# Patient Record
Sex: FEMALE
[De-identification: names, ages, dates, MRNs, and addresses within clinical notes are randomized per-mention and may not be internally consistent; named-entity substitution may affect disease eponyms.]

---

## 2018-11-02 ENCOUNTER — HOSPITAL ENCOUNTER (EMERGENCY)
Dept: HOSPITAL 42 - ED | Age: 23
Discharge: HOME | End: 2018-11-02
Payer: MEDICAID

## 2018-11-02 VITALS — OXYGEN SATURATION: 99 % | DIASTOLIC BLOOD PRESSURE: 64 MMHG | SYSTOLIC BLOOD PRESSURE: 103 MMHG

## 2018-11-02 VITALS — BODY MASS INDEX: 21.9 KG/M2

## 2018-11-02 VITALS — RESPIRATION RATE: 18 BRPM | TEMPERATURE: 98.4 F

## 2018-11-02 VITALS — HEART RATE: 96 BPM

## 2018-11-02 DIAGNOSIS — N39.0: Primary | ICD-10-CM

## 2018-11-02 DIAGNOSIS — D72.829: ICD-10-CM

## 2018-11-02 LAB
ALBUMIN SERPL-MCNC: 4.3 G/DL (ref 3–4.8)
ALBUMIN/GLOB SERPL: 1.3 {RATIO} (ref 1.1–1.8)
ALT SERPL-CCNC: 21 U/L (ref 7–56)
APPEARANCE UR: (no result)
AST SERPL-CCNC: 23 U/L (ref 14–36)
BACTERIA #/AREA URNS HPF: (no result) /[HPF]
BASOPHILS # BLD AUTO: 0.02 K/MM3 (ref 0–2)
BASOPHILS NFR BLD: 0.2 % (ref 0–3)
BILIRUB UR-MCNC: NEGATIVE MG/DL
BUN SERPL-MCNC: 10 MG/DL (ref 7–21)
CALCIUM SERPL-MCNC: 8.8 MG/DL (ref 8.4–10.5)
COLOR UR: (no result)
EOSINOPHIL # BLD: 0.1 10*3/UL (ref 0–0.7)
EOSINOPHIL NFR BLD: 1 % (ref 1.5–5)
ERYTHROCYTE [DISTWIDTH] IN BLOOD BY AUTOMATED COUNT: 12.8 % (ref 11.5–14.5)
GFR NON-AFRICAN AMERICAN: > 60
GLUCOSE UR STRIP-MCNC: NEGATIVE MG/DL
GRANULOCYTES # BLD: 9.59 10*3/UL (ref 1.4–6.5)
GRANULOCYTES NFR BLD: 73.5 % (ref 50–68)
HGB BLD-MCNC: 13.7 G/DL (ref 12–16)
LEUKOCYTE ESTERASE UR-ACNC: (no result) LEU/UL
LIPASE SERPL-CCNC: 87 U/L (ref 23–300)
LYMPHOCYTES # BLD: 2.3 10*3/UL (ref 1.2–3.4)
LYMPHOCYTES NFR BLD AUTO: 17.5 % (ref 22–35)
MCH RBC QN AUTO: 28.7 PG (ref 25–35)
MCHC RBC AUTO-ENTMCNC: 33.7 G/DL (ref 31–37)
MCV RBC AUTO: 85.3 FL (ref 80–105)
MONOCYTES # BLD AUTO: 1 10*3/UL (ref 0.1–0.6)
MONOCYTES NFR BLD: 7.8 % (ref 1–6)
PH UR STRIP: 6 [PH] (ref 4.7–8)
PLATELET # BLD: 284 10^3/UL (ref 120–450)
PMV BLD AUTO: 9.1 FL (ref 7–11)
PROT UR STRIP-MCNC: 100 MG/DL
RBC # BLD AUTO: 4.77 10^6/UL (ref 3.5–6.1)
RBC # UR STRIP: (no result) /UL
RBC #/AREA URNS HPF: (no result) /HPF (ref 0–2)
SP GR UR STRIP: >= 1.03 (ref 1–1.03)
UROBILINOGEN UR STRIP-ACNC: 0.2 E.U./DL
WBC # BLD AUTO: 13 10^3/UL (ref 4.5–11)
WBC #/AREA URNS HPF: (no result) /HPF (ref 0–6)

## 2018-11-02 NOTE — ED PDOC
Arrival/HPI





- General


Chief Complaint: Female Genitourinary


Time Seen by Provider: 11/02/18 18:50


Historian: Patient





- History of Present Illness


Time/Duration: Other (2 days)


Symptom Onset: Gradual


Symptom Course: Unchanged


Quality: Aching


Severity Level: Mild


Activities at Onset: Rest


Associated Symptoms (Text): 





11/02/18 19:01


Patient complains of a 2 day history of dysuria frequency and mild hematuria. 

She developed some right flank pain today. No abdominal pain nausea vomiting or 

diarrhea. No radiation of the pain. No fever or chills. She has never 

experienced this previously. LMP is one month ago. She appears comfortable and 

in no distress. She does not need pain medication at this time.





Past Medical History





- Infectious Disease


Hx of Infectious Diseases: None





- Genitourinary/Gynecological


Hx Urinary Tract Infection: Yes





- Psychiatric


Hx Substance Use: No





- Anesthesia


Hx Anesthesia: No





Family/Social History





- Physician Review


Nursing Documentation Reviewed: Yes


Family/Social History: Unknown Family HX


Smoking Status: Never Smoked


Hx Alcohol Use: Yes


Frequency of alcohol use: Socially


Hx Substance Use: No





Allergies/Home Meds


Allergies/Adverse Reactions: 


Allergies





No Known Allergies Allergy (Verified 11/02/18 18:41)


   











Review of Systems





- Physician Review


All systems were reviewed & negative as marked: Yes





- Review of Systems


Constitutional: absent: Fatigue, Fevers


Respiratory: Normal


Cardiovascular: Normal


Gastrointestinal: Normal


Genitourinary Female: Dysuria, Frequency, Hematuria.  absent: Vaginal Bleeding, 

Vaginal Discharge





Physical Exam





Vital Signs











  Temp Pulse Resp BP Pulse Ox


 


 11/02/18 18:42  98.4 F  102 H  18  118/77  100











Temperature: Afebrile


Blood Pressure: Normal


Pulse: Regular


Respiratory Rate: Normal


Appearance: Positive for: Well-Appearing, Non-Toxic, Comfortable


Pain Distress: None


Mental Status: Positive for: Alert and Oriented X 3





- Systems Exam


Head: Present: Atraumatic, Normocephalic


Neck: Present: Normal Range of Motion


Respiratory/Chest: Present: Clear to Auscultation, Good Air Exchange.  No: 

Respiratory Distress, Accessory Muscle Use


Cardiovascular: Present: Regular Rate and Rhythm, Normal S1, S2.  No: Murmurs


Abdomen: Present: Normal Bowel Sounds, Other (no CVA tenderness).  No: 

Tenderness, Distention, Peritoneal Signs, Rebound, Guarding


Back: Present: Normal Inspection.  No: CVA Tenderness, Midline Tenderness, 

Paraspinal Tenderness


Upper Extremity: Present: Normal Inspection.  No: Cyanosis, Edema


Lower Extremity: Present: Normal Inspection.  No: Edema


Neurological: Present: GCS=15, CN II-XII Intact, Speech Normal, Motor Func 

Grossly Intact


Skin: Present: Warm, Dry, Normal Color.  No: Rashes


Psychiatric: Present: Alert, Oriented x 3, Normal Insight, Normal Concentration





Medical Decision Making


ED Course and Treatment: 





11/02/18 19:03


Probable UTI. Will get CT scan to rule out nephrolithiasis.





- RAD Interpretation


Narrative RAD Interpretations (Text): 


11/02/18 20:40


CT abd/pelvis reviewed, shows: 


IMPRESSION: 


1. Small fat-containing umbilical hernia is noted. 


2. Appendix is not identified.   There is  no evidence of acute appendicitis. 


3. Small amount of fluid in the cul-de-sac. 


4. Bladder wall thickening is noted measuring up to 7 mm, consistent with 

cystitis.


Radiology Orders: 











11/02/18 18:59


ABD & PELVIS W/O PO OR IV CONT [CT] Stat 














- Scribe Statement


The provider has reviewed the documentation as recorded by the Scribdawn Perdomo





All medical record entries made by the Scribe were at my direction and 

personally dictated by me. I have reviewed the chart and agree that the record 

accurately reflects my personal performance of the history, physical exam, 

medical decision making, and the department course for this patient. I have also

 personally directed, reviewed, and agree with the discharge instructions and 

disposition.














Disposition/Present on Arrival





- Present on Arrival


Any Indicators Present on Arrival: No


History of DVT/PE: No


History of Uncontrolled Diabetes: No


Urinary Catheter: No


History of Decub. Ulcer: No


History Surgical Site Infection Following: None





- Disposition


Have Diagnosis and Disposition been Completed?: Yes


Diagnosis: 


 Urinary tract infection, Leukocytosis





Disposition: HOME/ ROUTINE


Disposition Time: 20:42


Patient Plan: Discharge


Condition: GOOD


Discharge Instructions (ExitCare):  Urinary Tract Infections in Adults


Additional Instructions: 


symptomatic treatment. Tylenol or Advil as directed on bottle as needed.  

Follow-up with PMD. Follow up in ER as needed.


Prescriptions: 


Sulfamethoxazole/Trimethoprim [Bactrim  mg-160 mg] 1 tab PO BID #14 tab


Phenazopyridine HCl [Pyridium] 200 mg PO Q8 #9 tablet


Referrals: 


PCP,NO [Primary Care Provider] - Follow up with primary


Forms:  [employer] (English), WORK NOTE

## 2018-11-03 NOTE — CT
Date of service: 



11/02/2018



PROCEDURE:  CT Abdomen and Pelvis without intravenous contrast



HISTORY:

right stone run



COMPARISON:

None.



TECHNIQUE:

Without contrast.. 



Contrast dose: 



Radiation dose:



Total exam DLP = 283.3 mGy-cm.



This CT exam was performed using one or more of the following dose 

reduction techniques: Automated exposure control, adjustment of the 

mA and/or kV according to patient size, and/or use of iterative 

reconstruction technique.



FINDINGS:



LOWER THORAX:

Unremarkable. 



LIVER:

Unremarkable. No gross lesion or ductal dilatation.  



GALLBLADDER AND BILE DUCTS:

Unremarkable. 



PANCREAS:

Unremarkable. No gross lesion or ductal dilatation.



SPLEEN:

Unremarkable. 



ADRENALS:

Unremarkable. No mass. 



KIDNEYS AND URETERS:

Unremarkable. No hydronephrosis. No solid mass. 



VASCULATURE:

Unremarkable. No aortic aneurysm. No aortic atherosclerotic 

calcification or mural plaque present.



BOWEL:

Unremarkable. No obstruction. No gross mural thickening. 



APPENDIX:

Not visualized 



PERITONEUM:

Small amount of free fluid 



LYMPH NODES:

Unremarkable. No enlarged lymph nodes. 



BLADDER:

Mild bladder wall thickening.  This could be due to lack of 

distension.  Cystitis cannot be excluded 



REPRODUCTIVE:

Unremarkable. 



BONES:

No acute fracture. 



OTHER FINDINGS:

The report concurs with the preliminary [facility] report



IMPRESSION:

Mild bladder wall thickening.  This could be due to lack of 

distension.  Cystitis cannot be excluded



No evidence of urolithiasis